# Patient Record
Sex: MALE | Race: WHITE | Employment: STUDENT | URBAN - METROPOLITAN AREA
[De-identification: names, ages, dates, MRNs, and addresses within clinical notes are randomized per-mention and may not be internally consistent; named-entity substitution may affect disease eponyms.]

---

## 2017-06-16 ENCOUNTER — OFFICE VISIT (OUTPATIENT)
Dept: FAMILY MEDICINE | Facility: CLINIC | Age: 22
End: 2017-06-16
Payer: COMMERCIAL

## 2017-06-16 VITALS
BODY MASS INDEX: 24.84 KG/M2 | SYSTOLIC BLOOD PRESSURE: 132 MMHG | DIASTOLIC BLOOD PRESSURE: 86 MMHG | HEIGHT: 73 IN | WEIGHT: 187.4 LBS | OXYGEN SATURATION: 97 % | HEART RATE: 81 BPM | TEMPERATURE: 97.5 F

## 2017-06-16 DIAGNOSIS — J01.90 ACUTE SINUSITIS WITH SYMPTOMS > 10 DAYS: Primary | ICD-10-CM

## 2017-06-16 DIAGNOSIS — J20.9 ACUTE BRONCHITIS WITH SYMPTOMS > 10 DAYS: ICD-10-CM

## 2017-06-16 PROCEDURE — 99213 OFFICE O/P EST LOW 20 MIN: CPT | Performed by: PHYSICIAN ASSISTANT

## 2017-06-16 RX ORDER — AZITHROMYCIN 250 MG/1
TABLET, FILM COATED ORAL
Qty: 6 TABLET | Refills: 0 | Status: SHIPPED | OUTPATIENT
Start: 2017-06-16 | End: 2019-03-25

## 2017-06-16 NOTE — NURSING NOTE
"Chief Complaint   Patient presents with     URI     ongoing x 2 months     /86  Pulse 81  Temp 97.5  F (36.4  C) (Tympanic)  Ht 6' 1\" (1.854 m)  Wt 187 lb 6.4 oz (85 kg)  SpO2 97%  BMI 24.72 kg/m2 Estimated body mass index is 24.72 kg/(m^2) as calculated from the following:    Height as of this encounter: 6' 1\" (1.854 m).    Weight as of this encounter: 187 lb 6.4 oz (85 kg).  Medication Reconciliation: complete      Health Maintenance due pending provider review:  NONE    n/a    Radha Hill CMA  "

## 2017-06-16 NOTE — PROGRESS NOTES
"  SUBJECTIVE:                                                    Neel Odell is a 21 year old male who presents to clinic today for the following health issues:      RESPIRATORY SYMPTOMS      Duration: ongoing x 2 months    Description  nasal congestion and cough, but nasal congestion is new    Severity: moderate    Accompanying signs and symptoms: None    History (predisposing factors):  none    Precipitating or alleviating factors: None    Therapies tried and outcome:  none           Problem list and histories reviewed & adjusted, as indicated.  Additional history: 22 y/o male here c/o not feeling well for the last 2 monthss.  Admits to the above symptoms with productive cough being the worst.  At first, this just started with some allergies or a cold, didn't bother too much. Over the last week or two, getting worse, to where he does not feel well.    BP Readings from Last 3 Encounters:   06/16/17 132/86   12/20/16 120/80   04/19/16 107/76    Wt Readings from Last 3 Encounters:   06/16/17 187 lb 6.4 oz (85 kg) (86 %)*   12/20/16 184 lb (83.5 kg) (85 %)*   04/19/16 192 lb 14.4 oz (87.5 kg) (91 %)*     * Growth percentiles are based on CDC 2-20 Years data.                    Reviewed and updated as needed this visit by clinical staff       Reviewed and updated as needed this visit by Provider         ROS:  Constitutional, HEENT, cardiovascular, pulmonary, gi and gu systems are negative, except as otherwise noted.    OBJECTIVE:                                                    /86  Pulse 81  Temp 97.5  F (36.4  C) (Tympanic)  Ht 6' 1\" (1.854 m)  Wt 187 lb 6.4 oz (85 kg)  SpO2 97%  BMI 24.72 kg/m2  Body mass index is 24.72 kg/(m^2).  GENERAL: alert and no distress  EYES: Eyes grossly normal to inspection  HENT: b/l TM dullness with loss of light reflux nasal mucosa is erythematous and edematous   NECK: no adenopathy, no asymmetry, masses, or scars and thyroid normal to palpation  RESP: rhonchi " throughout  CV: regular rate and rhythm, normal S1 S2, no S3 or S4, no murmur, click or rub, no peripheral edema and peripheral pulses strong  PSYCH: mentation appears normal, affect normal/bright    Diagnostic Test Results:  none      ASSESSMENT/PLAN:                                                            1. Acute sinusitis with symptoms > 10 days  OTC probiotics while on antibiotic to help limit some potential side effects.   - azithromycin (ZITHROMAX) 250 MG tablet; Two tablets first day, then one tablet daily for four days.  Dispense: 6 tablet; Refill: 0    2. Acute bronchitis with symptoms > 10 days    - azithromycin (ZITHROMAX) 250 MG tablet; Two tablets first day, then one tablet daily for four days.  Dispense: 6 tablet; Refill: 0    Follow up if symptoms persist or worsen     Derrick Fox PA-C  [facility]

## 2017-06-16 NOTE — MR AVS SNAPSHOT
"              After Visit Summary   2017    Neel Odell    MRN: [id number]           Patient Information     Date Of Birth          1995        Visit Information        Provider Department      2017 9:20 AM Derrick Fox PA-C [facility]        Today's Diagnoses     Acute sinusitis with symptoms > 10 days    -  1    Acute bronchitis with symptoms > 10 days           Follow-ups after your visit        Who to contact     If you have questions or need follow up information about today's clinic visit or your schedule please contact [facility] directly at [phone].  Normal or non-critical lab and imaging results will be communicated to you by MyChart, letter or phone within 4 business days after the clinic has received the results. If you do not hear from us within 7 days, please contact the clinic through [facility] or phone. If you have a critical or abnormal lab result, we will notify you by phone as soon as possible.  Submit refill requests through [facility] or call your pharmacy and they will forward the refill request to us. Please allow 3 business days for your refill to be completed.          Additional Information About Your Visit        MyChart Information     [facility] lets you send messages to your doctor, view your test results, renew your prescriptions, schedule appointments and more. To sign up, go to [email or web address] . Click on \"Log in\" on the left side of the screen, which will take you to the Welcome page. Then click on \"Sign up Now\" on the right side of the page.     You will be asked to enter the access code listed below, as well as some personal information. Please follow the directions to create your username and password.     Your access code is: [email or web address]  Expires: 2017  9:46 AM     Your access code will  in 90 days. If you need help or a new code, please call your [facility] or [phone].        Care EveryWhere ID     " "This is your Care EveryWhere ID. This could be used by other organizations to access your [facility] medical records  [id number]        Your Vitals Were     Pulse Temperature Height Pulse Oximetry BMI (Body Mass Index)       81 97.5  F (36.4  C) (Tympanic) 6' 1\" (1.854 m) 97% 24.72 kg/m2        Blood Pressure from Last 3 Encounters:   06/16/17 132/86   12/20/16 120/80   04/19/16 107/76    Weight from Last 3 Encounters:   06/16/17 187 lb 6.4 oz (85 kg) (86 %)*   12/20/16 184 lb (83.5 kg) (85 %)*   04/19/16 192 lb 14.4 oz (87.5 kg) (91 %)*     * Growth percentiles are based on [facility] 2-20 Years data.              Today, you had the following     No orders found for display         Today's Medication Changes          These changes are accurate as of: 6/16/17  9:46 AM.  If you have any questions, ask your nurse or doctor.               Start taking these medicines.        Dose/Directions    azithromycin 250 MG tablet   Commonly known as:  ZITHROMAX   Used for:  Acute sinusitis with symptoms > 10 days, Acute bronchitis with symptoms > 10 days   Started by:  Derrick Fox PA-C        Two tablets first day, then one tablet daily for four days.   Quantity:  6 tablet   Refills:  0            Where to get your medicines      These medications were sent to [facility] [address] [place] [address] [address] [facility] [address], [facility] [address]     Phone:  [phone]     azithromycin 250 MG tablet                Primary Care Provider Office Phone # Fax #    Bradley Hopkins [phone]       [facility] [address] [address]        Thank you!     Thank you for choosing [facility]  for your care. Our goal is always to provide you with excellent care. Hearing back from our patients is one way we can continue to improve our services. Please take a few minutes to complete the written survey that you may " receive in the mail after your visit with us. Thank you!             Your Updated Medication List - Protect others around you: Learn how to safely use, store and throw away your medicines at www.disposemymeds.org.          This list is accurate as of: 6/16/17  9:46 AM.  Always use your most recent med list.                   Brand Name Dispense Instructions for use    azithromycin 250 MG tablet    ZITHROMAX    6 tablet    Two tablets first day, then one tablet daily for four days.       ibuprofen 600 MG tablet    IBU    60 tablet    Take 1 tablet (600 mg) by mouth every 8 hours as needed for moderate pain       VALACYCLOVIR HCL PO      Take 500 mg by mouth daily Patient takes 1/2 of a 1000 mg tablet daily

## 2017-06-21 ENCOUNTER — TELEPHONE (OUTPATIENT)
Dept: FAMILY MEDICINE | Facility: CLINIC | Age: 22
End: 2017-06-21

## 2017-06-21 NOTE — TELEPHONE ENCOUNTER
He may have concurrent allergies, as his symptoms have been present for months.  Would start OTC claritin/zyrtec or allegra if he is already not.    Blayne Fox PA-C

## 2017-06-21 NOTE — TELEPHONE ENCOUNTER
JS  Pt completed z-pack today, states that his cough has gotten a little worse  Cough worsens at night, tickle in throat that he can't get rid of  Not coughing up more mucus but more bothersome, no fevers or new symptoms  Informed abx are still working after completion and will take a while for cough to clear up  Anything else you would like to add or send in med for cough symptoms?  Thank you,  Esperanza Jameson RN

## 2017-06-21 NOTE — TELEPHONE ENCOUNTER
Reason for call:  Patient reporting a symptom    Symptom or request: cough , phlegm     Duration (how long have symptoms been present): 2 months    Have you been treated for this before? Yes    Additional comments: saw the Dr for this on the 16th, was prescribed an antiobiotic , took the full dose of antibiotics  ended yesterday, but cough has only gotten worse  Patient would like to know what to do       Phone Number patient can be reached at:  Home number on file [phone] (home)    Best Time:  anytime    Can we leave a detailed message on this number:  YES    Call taken on 6/21/2017 at 10:34 AM by Tony Tomlinson

## 2019-03-25 ENCOUNTER — OFFICE VISIT (OUTPATIENT)
Dept: FAMILY MEDICINE | Facility: CLINIC | Age: 24
End: 2019-03-25
Payer: COMMERCIAL

## 2019-03-25 VITALS
TEMPERATURE: 99 F | SYSTOLIC BLOOD PRESSURE: 110 MMHG | WEIGHT: 186 LBS | HEART RATE: 65 BPM | RESPIRATION RATE: 16 BRPM | BODY MASS INDEX: 24.65 KG/M2 | DIASTOLIC BLOOD PRESSURE: 70 MMHG | HEIGHT: 73 IN | OXYGEN SATURATION: 98 %

## 2019-03-25 DIAGNOSIS — R03.0 ELEVATED BP WITHOUT DIAGNOSIS OF HYPERTENSION: Primary | ICD-10-CM

## 2019-03-25 PROCEDURE — 99213 OFFICE O/P EST LOW 20 MIN: CPT | Performed by: FAMILY MEDICINE

## 2019-03-25 ASSESSMENT — MIFFLIN-ST. JEOR: SCORE: 1894.63

## 2019-03-25 NOTE — PROGRESS NOTES
"  SUBJECTIVE:   Neel Odell is a 23 year old male who presents to clinic today for the following health issues:      Hypertension Follow-up      Outpatient blood pressures are being checked at store.  Results are 140/90 average .summer 2018, [place] & Blood pressure was high once    Low Salt Diet: not monitoring salt      Amount of exercise or physical activity: 6-7 days/week for an average of 30-45 minutes    Problems taking medications regularly: No    Medication side effects: none    Diet: regular (no restrictions)    BP Readings from Last 3 Encounters:   03/25/19 110/70   06/16/17 132/86   12/20/16 120/80       PROBLEMS TO ADD ON...    Problem list and histories reviewed & adjusted, as indicated.  Additional history: as documented    BP Readings from Last 3 Encounters:   03/25/19 110/70   06/16/17 132/86   12/20/16 120/80    Wt Readings from Last 3 Encounters:   03/25/19 84.4 kg (186 lb)   06/16/17 85 kg (187 lb 6.4 oz) (86 %)*   12/20/16 83.5 kg (184 lb) (85 %)*     * Growth percentiles are based on CDC (Boys, 2-20 Years) data.                    Reviewed and updated as needed this visit by clinical staff  Tobacco  Allergies  Meds  Med Hx  Surg Hx  Fam Hx  Soc Hx      Reviewed and updated as needed this visit by Provider         ROS:  Constitutional, HEENT, cardiovascular, pulmonary, GI, , musculoskeletal, neuro, skin, endocrine and psych systems are negative, except as otherwise noted.    OBJECTIVE:     /70   Pulse 65   Temp 99  F (37.2  C) (Oral)   Resp 16   Ht 1.842 m (6' 0.5\")   Wt 84.4 kg (186 lb)   SpO2 98%   BMI 24.88 kg/m    Body mass index is 24.88 kg/m .  GENERAL: healthy, alert and no distress  NECK: no adenopathy, no asymmetry, masses, or scars and thyroid normal to palpation  RESP: lungs clear to auscultation - no rales, rhonchi or wheezes  CV: regular rate and rhythm, normal S1 S2, no S3 or S4, no murmur, click or rub, no peripheral edema and peripheral pulses " strong  ABDOMEN: soft, nontender, no hepatosplenomegaly, no masses and bowel sounds normal  MS: no gross musculoskeletal defects noted, no edema  PSYCH: mentation appears normal, affect normal/bright      ASSESSMENT/PLAN:   1. Elevated BP without diagnosis of hypertension  Plan: reviewed previous and current Blood pressure-today in normotensive range  He does describe higher Blood pressure  Its possible he has mild pre-hypertension.  Patient is counseled to check it periodically at rest. Keep low salt diet. Add fresh veggies to diet and regular walks .  Follow up in 4 weeks for recheck- that can be nurse only appointment  And see provider if  Blood pressure > 140/90          Pam Petersen MD  [facility]

## 2019-03-25 NOTE — NURSING NOTE
"Chief Complaint   Patient presents with     Hypertension     /79   Pulse 65   Temp 99  F (37.2  C) (Oral)   Resp 16   Ht 1.842 m (6' 0.5\")   Wt 84.4 kg (186 lb)   SpO2 98%   BMI 24.88 kg/m   Estimated body mass index is 24.88 kg/m  as calculated from the following:    Height as of this encounter: 1.842 m (6' 0.5\").    Weight as of this encounter: 84.4 kg (186 lb).  bp completed using cuff size: large       Health Maintenance addressed:  NONE    n/a    Stacey King MA     "

## 2019-03-25 NOTE — PATIENT INSTRUCTIONS
Get Blood pressure - checked periodically  Sitting for at least 5 mins  Uncrossed legs  No caffeine in past 1 hr prior to Blood pressure check    If > 130/80- decrease salt intake  Stay active and get it rechecked in a month in office- nurse only appointment  If recheck >140/90

## 2019-10-02 ENCOUNTER — HEALTH MAINTENANCE LETTER (OUTPATIENT)
Age: 24
End: 2019-10-02

## 2021-01-15 ENCOUNTER — HEALTH MAINTENANCE LETTER (OUTPATIENT)
Age: 26
End: 2021-01-15

## 2021-09-04 ENCOUNTER — HEALTH MAINTENANCE LETTER (OUTPATIENT)
Age: 26
End: 2021-09-04

## 2022-02-19 ENCOUNTER — HEALTH MAINTENANCE LETTER (OUTPATIENT)
Age: 27
End: 2022-02-19

## 2022-10-22 ENCOUNTER — HEALTH MAINTENANCE LETTER (OUTPATIENT)
Age: 27
End: 2022-10-22

## 2023-04-01 ENCOUNTER — HEALTH MAINTENANCE LETTER (OUTPATIENT)
Age: 28
End: 2023-04-01

## 2024-06-02 ENCOUNTER — HEALTH MAINTENANCE LETTER (OUTPATIENT)
Age: 29
End: 2024-06-02